# Patient Record
Sex: FEMALE | Race: BLACK OR AFRICAN AMERICAN | NOT HISPANIC OR LATINO | URBAN - METROPOLITAN AREA
[De-identification: names, ages, dates, MRNs, and addresses within clinical notes are randomized per-mention and may not be internally consistent; named-entity substitution may affect disease eponyms.]

---

## 2022-07-11 ENCOUNTER — EMERGENCY (EMERGENCY)
Facility: HOSPITAL | Age: 22
LOS: 1 days | Discharge: HOME | End: 2022-07-11
Admitting: EMERGENCY MEDICINE

## 2022-07-11 VITALS
OXYGEN SATURATION: 100 % | DIASTOLIC BLOOD PRESSURE: 84 MMHG | SYSTOLIC BLOOD PRESSURE: 131 MMHG | HEART RATE: 104 BPM | TEMPERATURE: 98 F | RESPIRATION RATE: 18 BRPM

## 2022-07-11 PROCEDURE — 99283 EMERGENCY DEPT VISIT LOW MDM: CPT

## 2022-07-11 RX ORDER — LIDOCAINE 4 G/100G
15 CREAM TOPICAL ONCE
Refills: 0 | Status: COMPLETED | OUTPATIENT
Start: 2022-07-11 | End: 2022-07-11

## 2022-07-11 RX ORDER — IBUPROFEN 200 MG
600 TABLET ORAL ONCE
Refills: 0 | Status: COMPLETED | OUTPATIENT
Start: 2022-07-11 | End: 2022-07-11

## 2022-07-11 RX ADMIN — LIDOCAINE 15 MILLILITER(S): 4 CREAM TOPICAL at 23:56

## 2022-07-11 RX ADMIN — Medication 600 MILLIGRAM(S): at 23:26

## 2022-07-11 NOTE — ED PROVIDER NOTE - OBJECTIVE STATEMENT
Pt is a 23 y/o Female w/ PMHx of seasonal asthma p/w throat pains for last 2 days. Denies fever, chills. Went to [facility] tested negative for COVID and strept. and was released with recommendation to take Claritin. Of note, Pt is unaware of source of allergic reaction.

## 2022-07-11 NOTE — ED PROVIDER NOTE - PATIENT PORTAL LINK FT
You can access the FollowMyHealth Patient Portal offered by [facility] by registering at the following website: [email or web address]. By joining [employer]’s FollowMyHealth portal, you will also be able to view your health information using other applications (apps) compatible with our system.

## 2022-07-11 NOTE — ED PROVIDER NOTE - CPE EDP EYES NORM
normal... Pt states she lives in an apartment, no steps to enter, +elevator access, with spouse and children. Pt states she is out of work on disability at this time. Pt states her family assists her at times with ADLs. Pt states she was independent with a straight cane prior to admission. Pt states she has a shower chair.

## 2022-07-11 NOTE — ED ADULT TRIAGE NOTE - NS ED NURSE BANDS TYPE
DR. oSnia Tom:     Referral info:     Jaqui Kwon     At [facility] [place] [phone] Name band;

## 2022-07-11 NOTE — ED PROVIDER NOTE - NSFOLLOWUPINSTRUCTIONS_ED_ALL_ED_FT
Rest, drink plenty of fluids.  Advance activity as tolerated.  Continue all previously prescribed medications as directed.  Follow up with your primary care physician in 48-72 hours- bring copies of your results.  Return to the ER for worsening or persistent symptoms, and/or ANY NEW OR CONCERNING SYMPTOMS. If you have issues obtaining follow up, please call: [phone] to obtain a doctor or specialist who takes your insurance in your area.     Take Tylenol 650mg (Two 325 mg pills) every 4-6 hours as needed for pain.   Take Motrin 600 mg every 6-8 hours as needed for moderate pain -- take with food.

## 2022-07-11 NOTE — ED PROVIDER NOTE - PROGRESS NOTE DETAILS
PA ARNULFO: Patient reassessed, sitting comfortably in chair in NAD, denies any complaints. States feeling better, symptoms improved. Pt is medically stable for discharge and follow up with PMD. The patient was given verbal and written discharge instructions. Specifically, instructions when to return to the ED and when to seek follow-up from their pcp was discussed. Any specialty follow-up was discussed, including how to make an appointment.  Instructions were discussed in simple, plain language and was understood by the patient. The patient understands that should their symptoms worsen or any new symptoms arise, they should return to the ED immediately for further evaluation. All pt's questions were answered. Patient verbalizes understanding.

## 2022-07-11 NOTE — ED PROVIDER NOTE - THROAT FINDINGS
2+ tonsillar swelling, minimal exudate, no plaque, mild erythema, no drooling,/uvula midline/TONSILLAR SWELLING

## 2022-07-11 NOTE — ED PROVIDER NOTE - CLINICAL SUMMARY MEDICAL DECISION MAKING FREE TEXT BOX
Pt is a 21 y/o Female w/ PMHx of seasonal asthma p/w throat pains for last 2 days.   Concern for tonsilitis, Likely viral etiology.  Will provide pain control, viscous lidocaine, and provide PCP follow-up outpatient.